# Patient Record
Sex: FEMALE | Race: BLACK OR AFRICAN AMERICAN | NOT HISPANIC OR LATINO | URBAN - METROPOLITAN AREA
[De-identification: names, ages, dates, MRNs, and addresses within clinical notes are randomized per-mention and may not be internally consistent; named-entity substitution may affect disease eponyms.]

---

## 2020-07-13 ENCOUNTER — OFFICE (OUTPATIENT)
Dept: URBAN - METROPOLITAN AREA CLINIC 14 | Facility: CLINIC | Age: 45
End: 2020-07-13

## 2020-07-13 VITALS
RESPIRATION RATE: 16 BRPM | WEIGHT: 224 LBS | HEART RATE: 55 BPM | DIASTOLIC BLOOD PRESSURE: 77 MMHG | HEIGHT: 71 IN | SYSTOLIC BLOOD PRESSURE: 128 MMHG

## 2020-07-13 DIAGNOSIS — R10.84 GENERALIZED ABDOMINAL PAIN: ICD-10-CM

## 2020-07-13 DIAGNOSIS — R14.0 ABDOMINAL DISTENSION (GASEOUS): ICD-10-CM

## 2020-07-13 DIAGNOSIS — K59.00 CONSTIPATION, UNSPECIFIED: ICD-10-CM

## 2020-07-13 PROCEDURE — 99244 OFF/OP CNSLTJ NEW/EST MOD 40: CPT | Mod: 32 | Performed by: NURSE PRACTITIONER

## 2020-07-13 RX ORDER — DICYCLOMINE HYDROCHLORIDE 10 MG/1
CAPSULE ORAL
Qty: 40 | Refills: 1 | Status: COMPLETED
Start: 2020-07-13 | End: 2022-04-18

## 2020-07-13 RX ORDER — DOCUSATE SODIUM 100 MG/1
CAPSULE ORAL
Qty: 60 | Refills: 0 | Status: COMPLETED
Start: 2020-07-13 | End: 2021-10-11

## 2020-07-13 NOTE — SERVICEHPINOTES
Gabby Sy   is a   44   year old  female   here today at the request of Dr. Mccormick for evaluation of abdominal pain and increased gaseousness.  The patient had a gastric bypass in 2016 and has done well postoperatively.  She did have a small bowel obstruction due to internal hernia that required surgical intervention 12/2019.  She notes chronic bowel issues but has had worsening constipation over the last several weeks.  Since her gastric bypass surgery, she has been drinking warm prune juice daily and this has resulted in regular daily stools.  She has been working from home the past couple of months and notes her stool pattern has been somewhat different.  She still has daily stools but notes they are more difficult to pass.  She has not taken any medications for this but has increased her fluid intake.  She notes intermittent episodes of diffuse abdominal pain with bloating especially after eating but not with any particular food.   This will improve somewhat with taking Tums gummies. She notes this is very similar to what she experienced before the small-bowel obstruction.  She denies any heartburn or reflux and there has been no dysphagia.  She denies any melena or hematochezia.  She is not aware of any first-degree family history of colon cancer or polyps but does note her paternal uncle had colon cancer.

## 2020-08-10 ENCOUNTER — OFFICE (OUTPATIENT)
Dept: URBAN - METROPOLITAN AREA CLINIC 14 | Facility: CLINIC | Age: 45
End: 2020-08-10

## 2020-08-10 VITALS
WEIGHT: 223 LBS | RESPIRATION RATE: 18 BRPM | DIASTOLIC BLOOD PRESSURE: 78 MMHG | HEART RATE: 48 BPM | HEIGHT: 71 IN | SYSTOLIC BLOOD PRESSURE: 134 MMHG

## 2020-08-10 DIAGNOSIS — Z98.84 BARIATRIC SURGERY STATUS: ICD-10-CM

## 2020-08-10 DIAGNOSIS — R14.0 ABDOMINAL DISTENSION (GASEOUS): ICD-10-CM

## 2020-08-10 DIAGNOSIS — K76.9 LIVER DISEASE, UNSPECIFIED: ICD-10-CM

## 2020-08-10 DIAGNOSIS — K59.00 CONSTIPATION, UNSPECIFIED: ICD-10-CM

## 2020-08-10 DIAGNOSIS — R10.11 RIGHT UPPER QUADRANT PAIN: ICD-10-CM

## 2020-08-10 PROCEDURE — 99214 OFFICE O/P EST MOD 30 MIN: CPT | Performed by: NURSE PRACTITIONER

## 2020-08-14 ENCOUNTER — AMBULATORY SURGICAL CENTER (OUTPATIENT)
Dept: URBAN - METROPOLITAN AREA SURGERY 2 | Facility: SURGERY | Age: 45
End: 2020-08-14

## 2020-08-14 ENCOUNTER — OFFICE (OUTPATIENT)
Dept: URBAN - METROPOLITAN AREA PATHOLOGY 22 | Facility: PATHOLOGY | Age: 45
End: 2020-08-14

## 2020-08-14 VITALS
HEART RATE: 52 BPM | OXYGEN SATURATION: 99 % | HEIGHT: 71 IN | SYSTOLIC BLOOD PRESSURE: 143 MMHG | HEART RATE: 73 BPM | SYSTOLIC BLOOD PRESSURE: 155 MMHG | HEART RATE: 52 BPM | HEIGHT: 71 IN | RESPIRATION RATE: 16 BRPM | DIASTOLIC BLOOD PRESSURE: 77 MMHG | SYSTOLIC BLOOD PRESSURE: 134 MMHG | TEMPERATURE: 98.8 F | SYSTOLIC BLOOD PRESSURE: 134 MMHG | OXYGEN SATURATION: 96 % | DIASTOLIC BLOOD PRESSURE: 90 MMHG | TEMPERATURE: 98.8 F | SYSTOLIC BLOOD PRESSURE: 147 MMHG | DIASTOLIC BLOOD PRESSURE: 83 MMHG | HEART RATE: 54 BPM | OXYGEN SATURATION: 99 % | DIASTOLIC BLOOD PRESSURE: 90 MMHG | DIASTOLIC BLOOD PRESSURE: 86 MMHG | SYSTOLIC BLOOD PRESSURE: 139 MMHG | WEIGHT: 215 LBS | OXYGEN SATURATION: 96 % | TEMPERATURE: 98.2 F | DIASTOLIC BLOOD PRESSURE: 92 MMHG | SYSTOLIC BLOOD PRESSURE: 143 MMHG | DIASTOLIC BLOOD PRESSURE: 92 MMHG | TEMPERATURE: 98.2 F | SYSTOLIC BLOOD PRESSURE: 155 MMHG | HEART RATE: 54 BPM | WEIGHT: 215 LBS | DIASTOLIC BLOOD PRESSURE: 83 MMHG | SYSTOLIC BLOOD PRESSURE: 147 MMHG | RESPIRATION RATE: 16 BRPM | SYSTOLIC BLOOD PRESSURE: 155 MMHG | HEIGHT: 71 IN | OXYGEN SATURATION: 99 % | SYSTOLIC BLOOD PRESSURE: 147 MMHG | DIASTOLIC BLOOD PRESSURE: 77 MMHG | HEART RATE: 50 BPM | DIASTOLIC BLOOD PRESSURE: 92 MMHG | WEIGHT: 215 LBS | SYSTOLIC BLOOD PRESSURE: 143 MMHG | HEART RATE: 65 BPM | HEART RATE: 65 BPM | DIASTOLIC BLOOD PRESSURE: 86 MMHG | HEART RATE: 50 BPM | HEART RATE: 65 BPM | RESPIRATION RATE: 16 BRPM | TEMPERATURE: 98.8 F | SYSTOLIC BLOOD PRESSURE: 134 MMHG | RESPIRATION RATE: 17 BRPM | HEART RATE: 54 BPM | HEART RATE: 73 BPM | HEART RATE: 73 BPM | RESPIRATION RATE: 17 BRPM | RESPIRATION RATE: 17 BRPM | SYSTOLIC BLOOD PRESSURE: 139 MMHG | HEART RATE: 50 BPM | DIASTOLIC BLOOD PRESSURE: 86 MMHG | TEMPERATURE: 98.2 F | OXYGEN SATURATION: 96 % | DIASTOLIC BLOOD PRESSURE: 90 MMHG | DIASTOLIC BLOOD PRESSURE: 77 MMHG | SYSTOLIC BLOOD PRESSURE: 139 MMHG | HEART RATE: 52 BPM | DIASTOLIC BLOOD PRESSURE: 83 MMHG

## 2020-08-14 DIAGNOSIS — Z98.84 BARIATRIC SURGERY STATUS: ICD-10-CM

## 2020-08-14 DIAGNOSIS — K29.50 UNSPECIFIED CHRONIC GASTRITIS WITHOUT BLEEDING: ICD-10-CM

## 2020-08-14 DIAGNOSIS — R10.11 RIGHT UPPER QUADRANT PAIN: ICD-10-CM

## 2020-08-14 PROBLEM — K31.89 OTHER DISEASES OF STOMACH AND DUODENUM: Status: ACTIVE | Noted: 2020-08-14

## 2020-08-14 PROBLEM — Z48.815 ENCNTR FOR SURGICAL AFTCR FOLLOWING SURGERY ON THE DGSTV SYS: Status: ACTIVE | Noted: 2020-08-14

## 2020-08-14 PROCEDURE — 43239 EGD BIOPSY SINGLE/MULTIPLE: CPT | Performed by: INTERNAL MEDICINE

## 2020-08-14 PROCEDURE — 88305 TISSUE EXAM BY PATHOLOGIST: CPT | Performed by: INTERNAL MEDICINE

## 2020-08-14 PROCEDURE — 88342 IMHCHEM/IMCYTCHM 1ST ANTB: CPT | Performed by: INTERNAL MEDICINE

## 2020-08-14 PROCEDURE — 88313 SPECIAL STAINS GROUP 2: CPT | Performed by: INTERNAL MEDICINE

## 2020-08-19 ENCOUNTER — OFFICE (OUTPATIENT)
Dept: URBAN - METROPOLITAN AREA CLINIC 14 | Facility: CLINIC | Age: 45
End: 2020-08-19

## 2020-08-19 VITALS — HEIGHT: 71 IN

## 2020-08-19 DIAGNOSIS — K76.9 LIVER DISEASE, UNSPECIFIED: ICD-10-CM

## 2020-08-19 PROCEDURE — 91200 LIVER ELASTOGRAPHY: CPT | Performed by: NURSE PRACTITIONER

## 2021-10-11 ENCOUNTER — OFFICE (OUTPATIENT)
Dept: URBAN - METROPOLITAN AREA CLINIC 14 | Facility: CLINIC | Age: 46
End: 2021-10-11

## 2021-10-11 VITALS
HEART RATE: 64 BPM | OXYGEN SATURATION: 98 % | DIASTOLIC BLOOD PRESSURE: 56 MMHG | HEIGHT: 71 IN | WEIGHT: 235 LBS | SYSTOLIC BLOOD PRESSURE: 127 MMHG

## 2021-10-11 DIAGNOSIS — R51.9 HEADACHE, UNSPECIFIED: ICD-10-CM

## 2021-10-11 DIAGNOSIS — R10.9 UNSPECIFIED ABDOMINAL PAIN: ICD-10-CM

## 2021-10-11 PROCEDURE — 99214 OFFICE O/P EST MOD 30 MIN: CPT | Performed by: INTERNAL MEDICINE

## 2021-10-11 RX ORDER — SUCRALFATE 1 G/1
TABLET ORAL
Qty: 360 | Refills: 1 | Status: ACTIVE
Start: 2020-07-30

## 2021-10-11 RX ORDER — PANTOPRAZOLE 40 MG/1
40 TABLET, DELAYED RELEASE ORAL
Qty: 30 | Refills: 11 | Status: ACTIVE

## 2022-04-18 ENCOUNTER — OFFICE (OUTPATIENT)
Dept: URBAN - METROPOLITAN AREA CLINIC 14 | Facility: CLINIC | Age: 47
End: 2022-04-18

## 2022-04-18 VITALS
DIASTOLIC BLOOD PRESSURE: 79 MMHG | RESPIRATION RATE: 18 BRPM | OXYGEN SATURATION: 100 % | WEIGHT: 238 LBS | SYSTOLIC BLOOD PRESSURE: 116 MMHG | HEIGHT: 71 IN | HEART RATE: 59 BPM

## 2022-04-18 DIAGNOSIS — R10.9 UNSPECIFIED ABDOMINAL PAIN: ICD-10-CM

## 2022-04-18 DIAGNOSIS — R19.4 CHANGE IN BOWEL HABIT: ICD-10-CM

## 2022-04-18 PROCEDURE — 99214 OFFICE O/P EST MOD 30 MIN: CPT | Performed by: INTERNAL MEDICINE

## 2022-08-01 ENCOUNTER — OFFICE (OUTPATIENT)
Dept: URBAN - METROPOLITAN AREA CLINIC 11 | Facility: CLINIC | Age: 47
End: 2022-08-01

## 2022-08-01 VITALS
WEIGHT: 230 LBS | SYSTOLIC BLOOD PRESSURE: 129 MMHG | HEIGHT: 71 IN | DIASTOLIC BLOOD PRESSURE: 85 MMHG | RESPIRATION RATE: 16 BRPM | OXYGEN SATURATION: 100 % | HEART RATE: 72 BPM

## 2022-08-01 DIAGNOSIS — Z98.84 BARIATRIC SURGERY STATUS: ICD-10-CM

## 2022-08-01 DIAGNOSIS — R14.0 ABDOMINAL DISTENSION (GASEOUS): ICD-10-CM

## 2022-08-01 DIAGNOSIS — R10.84 GENERALIZED ABDOMINAL PAIN: ICD-10-CM

## 2022-08-01 PROCEDURE — 99214 OFFICE O/P EST MOD 30 MIN: CPT | Performed by: NURSE PRACTITIONER

## 2022-08-01 RX ORDER — POLYETHYLENE GLYCOL 3350, SODIUM SULFATE, SODIUM CHLORIDE, POTASSIUM CHLORIDE, ASCORBIC ACID, SODIUM ASCORBATE 140-9-5.2G
KIT ORAL
Qty: 1 | Refills: 0 | Status: COMPLETED
Start: 2022-08-01 | End: 2022-12-09

## 2022-12-09 ENCOUNTER — OFFICE (OUTPATIENT)
Dept: URBAN - METROPOLITAN AREA PATHOLOGY 20 | Facility: PATHOLOGY | Age: 47
End: 2022-12-09
Payer: COMMERCIAL

## 2022-12-09 ENCOUNTER — AMBULATORY SURGICAL CENTER (OUTPATIENT)
Dept: URBAN - METROPOLITAN AREA SURGERY 2 | Facility: SURGERY | Age: 47
End: 2022-12-09
Payer: COMMERCIAL

## 2022-12-09 VITALS
DIASTOLIC BLOOD PRESSURE: 78 MMHG | WEIGHT: 233 LBS | OXYGEN SATURATION: 96 % | SYSTOLIC BLOOD PRESSURE: 106 MMHG | DIASTOLIC BLOOD PRESSURE: 54 MMHG | RESPIRATION RATE: 16 BRPM | TEMPERATURE: 98.2 F | DIASTOLIC BLOOD PRESSURE: 62 MMHG | HEART RATE: 53 BPM | DIASTOLIC BLOOD PRESSURE: 42 MMHG | HEART RATE: 84 BPM | HEART RATE: 84 BPM | DIASTOLIC BLOOD PRESSURE: 60 MMHG | HEART RATE: 80 BPM | TEMPERATURE: 98.4 F | HEIGHT: 71 IN | HEART RATE: 64 BPM | HEART RATE: 56 BPM | DIASTOLIC BLOOD PRESSURE: 54 MMHG | DIASTOLIC BLOOD PRESSURE: 78 MMHG | OXYGEN SATURATION: 98 % | HEIGHT: 71 IN | SYSTOLIC BLOOD PRESSURE: 125 MMHG | SYSTOLIC BLOOD PRESSURE: 106 MMHG | OXYGEN SATURATION: 98 % | SYSTOLIC BLOOD PRESSURE: 125 MMHG | DIASTOLIC BLOOD PRESSURE: 42 MMHG | OXYGEN SATURATION: 100 % | OXYGEN SATURATION: 97 % | SYSTOLIC BLOOD PRESSURE: 110 MMHG | WEIGHT: 233 LBS | DIASTOLIC BLOOD PRESSURE: 60 MMHG | SYSTOLIC BLOOD PRESSURE: 110 MMHG | HEART RATE: 53 BPM | HEART RATE: 80 BPM | SYSTOLIC BLOOD PRESSURE: 100 MMHG | OXYGEN SATURATION: 96 % | RESPIRATION RATE: 16 BRPM | TEMPERATURE: 98.4 F | SYSTOLIC BLOOD PRESSURE: 100 MMHG | TEMPERATURE: 98.2 F | HEART RATE: 56 BPM | DIASTOLIC BLOOD PRESSURE: 62 MMHG | HEART RATE: 64 BPM | OXYGEN SATURATION: 100 % | OXYGEN SATURATION: 97 %

## 2022-12-09 DIAGNOSIS — K63.5 POLYP OF COLON: ICD-10-CM

## 2022-12-09 DIAGNOSIS — Z12.11 ENCOUNTER FOR SCREENING FOR MALIGNANT NEOPLASM OF COLON: ICD-10-CM

## 2022-12-09 DIAGNOSIS — K64.0 FIRST DEGREE HEMORRHOIDS: ICD-10-CM

## 2022-12-09 DIAGNOSIS — K57.30 DIVERTICULOSIS OF LARGE INTESTINE WITHOUT PERFORATION OR ABS: ICD-10-CM

## 2022-12-09 DIAGNOSIS — K64.8 OTHER HEMORRHOIDS: ICD-10-CM

## 2022-12-09 PROBLEM — D12.0 BENIGN NEOPLASM OF CECUM: Status: ACTIVE | Noted: 2022-12-09

## 2022-12-09 PROCEDURE — 45380 COLONOSCOPY AND BIOPSY: CPT | Mod: 33 | Performed by: INTERNAL MEDICINE

## 2022-12-09 RX ADMIN — PROPOFOL 350 MG: 10 INJECTION, EMULSION INTRAVENOUS at 06:40

## 2023-01-24 ENCOUNTER — OFFICE (OUTPATIENT)
Dept: URBAN - METROPOLITAN AREA CLINIC 11 | Facility: CLINIC | Age: 48
End: 2023-01-24

## 2023-06-20 ENCOUNTER — OFFICE (OUTPATIENT)
Dept: URBAN - METROPOLITAN AREA CLINIC 11 | Facility: CLINIC | Age: 48
End: 2023-06-20

## 2023-06-20 VITALS
HEIGHT: 71 IN | DIASTOLIC BLOOD PRESSURE: 86 MMHG | WEIGHT: 250 LBS | OXYGEN SATURATION: 98 % | HEART RATE: 72 BPM | SYSTOLIC BLOOD PRESSURE: 137 MMHG

## 2023-06-20 DIAGNOSIS — K21.9 GASTRO-ESOPHAGEAL REFLUX DISEASE WITHOUT ESOPHAGITIS: ICD-10-CM

## 2023-06-20 DIAGNOSIS — Z98.84 BARIATRIC SURGERY STATUS: ICD-10-CM

## 2023-06-20 DIAGNOSIS — R14.0 ABDOMINAL DISTENSION (GASEOUS): ICD-10-CM

## 2023-06-20 DIAGNOSIS — R11.0 NAUSEA: ICD-10-CM

## 2023-06-20 DIAGNOSIS — R10.13 EPIGASTRIC PAIN: ICD-10-CM

## 2023-06-20 PROCEDURE — 99214 OFFICE O/P EST MOD 30 MIN: CPT | Performed by: NURSE PRACTITIONER

## 2023-06-20 RX ORDER — RIFAXIMIN 550 MG/1
TABLET ORAL
Qty: 42 | Refills: 0 | Status: COMPLETED
Start: 2023-06-20 | End: 2023-06-30

## 2023-08-11 ENCOUNTER — OFFICE (OUTPATIENT)
Dept: URBAN - METROPOLITAN AREA PATHOLOGY 12 | Facility: PATHOLOGY | Age: 48
End: 2023-08-11

## 2023-08-11 ENCOUNTER — AMBULATORY SURGICAL CENTER (OUTPATIENT)
Dept: URBAN - METROPOLITAN AREA SURGERY 2 | Facility: SURGERY | Age: 48
End: 2023-08-11

## 2023-08-11 VITALS
DIASTOLIC BLOOD PRESSURE: 78 MMHG | OXYGEN SATURATION: 100 % | DIASTOLIC BLOOD PRESSURE: 68 MMHG | WEIGHT: 244 LBS | DIASTOLIC BLOOD PRESSURE: 54 MMHG | HEART RATE: 82 BPM | OXYGEN SATURATION: 98 % | HEIGHT: 71 IN | RESPIRATION RATE: 16 BRPM | OXYGEN SATURATION: 98 % | SYSTOLIC BLOOD PRESSURE: 112 MMHG | SYSTOLIC BLOOD PRESSURE: 100 MMHG | OXYGEN SATURATION: 96 % | DIASTOLIC BLOOD PRESSURE: 68 MMHG | HEART RATE: 75 BPM | TEMPERATURE: 98.2 F | HEART RATE: 82 BPM | HEIGHT: 71 IN | TEMPERATURE: 98.6 F | RESPIRATION RATE: 17 BRPM | HEIGHT: 71 IN | HEART RATE: 52 BPM | TEMPERATURE: 98.2 F | DIASTOLIC BLOOD PRESSURE: 68 MMHG | WEIGHT: 244 LBS | SYSTOLIC BLOOD PRESSURE: 100 MMHG | OXYGEN SATURATION: 96 % | HEART RATE: 75 BPM | DIASTOLIC BLOOD PRESSURE: 78 MMHG | RESPIRATION RATE: 17 BRPM | HEART RATE: 92 BPM | SYSTOLIC BLOOD PRESSURE: 100 MMHG | DIASTOLIC BLOOD PRESSURE: 78 MMHG | DIASTOLIC BLOOD PRESSURE: 66 MMHG | OXYGEN SATURATION: 98 % | OXYGEN SATURATION: 96 % | SYSTOLIC BLOOD PRESSURE: 119 MMHG | DIASTOLIC BLOOD PRESSURE: 54 MMHG | SYSTOLIC BLOOD PRESSURE: 112 MMHG | HEART RATE: 52 BPM | HEART RATE: 68 BPM | HEART RATE: 92 BPM | SYSTOLIC BLOOD PRESSURE: 110 MMHG | OXYGEN SATURATION: 100 % | TEMPERATURE: 98.2 F | TEMPERATURE: 98.6 F | RESPIRATION RATE: 17 BRPM | HEART RATE: 68 BPM | SYSTOLIC BLOOD PRESSURE: 119 MMHG | DIASTOLIC BLOOD PRESSURE: 54 MMHG | TEMPERATURE: 98.6 F | SYSTOLIC BLOOD PRESSURE: 125 MMHG | RESPIRATION RATE: 18 BRPM | RESPIRATION RATE: 20 BRPM | DIASTOLIC BLOOD PRESSURE: 66 MMHG | RESPIRATION RATE: 20 BRPM | HEART RATE: 75 BPM | SYSTOLIC BLOOD PRESSURE: 119 MMHG | RESPIRATION RATE: 18 BRPM | RESPIRATION RATE: 18 BRPM | RESPIRATION RATE: 16 BRPM | WEIGHT: 244 LBS | HEART RATE: 82 BPM | SYSTOLIC BLOOD PRESSURE: 125 MMHG | RESPIRATION RATE: 20 BRPM | SYSTOLIC BLOOD PRESSURE: 125 MMHG | SYSTOLIC BLOOD PRESSURE: 112 MMHG | SYSTOLIC BLOOD PRESSURE: 110 MMHG | HEART RATE: 52 BPM | HEART RATE: 68 BPM | HEART RATE: 92 BPM | SYSTOLIC BLOOD PRESSURE: 110 MMHG | RESPIRATION RATE: 16 BRPM | OXYGEN SATURATION: 100 % | DIASTOLIC BLOOD PRESSURE: 66 MMHG

## 2023-08-11 DIAGNOSIS — K31.89 OTHER DISEASES OF STOMACH AND DUODENUM: ICD-10-CM

## 2023-08-11 DIAGNOSIS — T18.2XXA FOREIGN BODY IN STOMACH, INITIAL ENCOUNTER: ICD-10-CM

## 2023-08-11 DIAGNOSIS — R10.13 EPIGASTRIC PAIN: ICD-10-CM

## 2023-08-11 DIAGNOSIS — Z98.84 BARIATRIC SURGERY STATUS: ICD-10-CM

## 2023-08-11 DIAGNOSIS — K21.9 GASTRO-ESOPHAGEAL REFLUX DISEASE WITHOUT ESOPHAGITIS: ICD-10-CM

## 2023-08-11 DIAGNOSIS — Z48.815 ENCOUNTER FOR SURGICAL AFTERCARE FOLLOWING SURGERY ON THE DI: ICD-10-CM

## 2023-08-11 PROCEDURE — 43247 EGD REMOVE FOREIGN BODY: CPT | Performed by: INTERNAL MEDICINE

## 2023-08-11 PROCEDURE — 88305 TISSUE EXAM BY PATHOLOGIST: CPT | Performed by: PATHOLOGY

## 2023-08-11 PROCEDURE — 43239 EGD BIOPSY SINGLE/MULTIPLE: CPT | Mod: 51 | Performed by: INTERNAL MEDICINE

## 2023-08-11 PROCEDURE — 43239 EGD BIOPSY SINGLE/MULTIPLE: CPT | Performed by: INTERNAL MEDICINE
